# Patient Record
Sex: MALE | Race: WHITE | NOT HISPANIC OR LATINO | Employment: PART TIME | URBAN - METROPOLITAN AREA
[De-identification: names, ages, dates, MRNs, and addresses within clinical notes are randomized per-mention and may not be internally consistent; named-entity substitution may affect disease eponyms.]

---

## 2017-11-10 ENCOUNTER — TRANSCRIBE ORDERS (OUTPATIENT)
Dept: ADMINISTRATIVE | Facility: HOSPITAL | Age: 22
End: 2017-11-10

## 2017-11-10 ENCOUNTER — APPOINTMENT (OUTPATIENT)
Dept: LAB | Facility: HOSPITAL | Age: 22
End: 2017-11-10
Payer: COMMERCIAL

## 2017-11-10 DIAGNOSIS — E66.01 MORBID OBESITY (HCC): ICD-10-CM

## 2017-11-10 DIAGNOSIS — Z13.1 SCREENING FOR DIABETES MELLITUS: ICD-10-CM

## 2017-11-10 DIAGNOSIS — Z13.29 SCREENING FOR THYROID DISORDER: ICD-10-CM

## 2017-11-10 DIAGNOSIS — R53.83 FATIGUE, UNSPECIFIED TYPE: ICD-10-CM

## 2017-11-10 DIAGNOSIS — Z13.89 ENCOUNTER FOR ROUTINE SCREENING FOR MALFORMATION USING ULTRASONICS: ICD-10-CM

## 2017-11-10 DIAGNOSIS — Z13.220 SCREENING FOR LIPOID DISORDERS: ICD-10-CM

## 2017-11-10 DIAGNOSIS — Z00.00 ROUTINE GENERAL MEDICAL EXAMINATION AT A HEALTH CARE FACILITY: Primary | ICD-10-CM

## 2017-11-10 DIAGNOSIS — Z13.0 SCREENING FOR IRON DEFICIENCY ANEMIA: ICD-10-CM

## 2017-11-10 DIAGNOSIS — Z00.00 ROUTINE GENERAL MEDICAL EXAMINATION AT A HEALTH CARE FACILITY: ICD-10-CM

## 2017-11-10 LAB
ALBUMIN SERPL BCP-MCNC: 4 G/DL (ref 3.5–5)
ALP SERPL-CCNC: 78 U/L (ref 46–484)
ALT SERPL W P-5'-P-CCNC: 26 U/L (ref 12–78)
ANION GAP SERPL CALCULATED.3IONS-SCNC: 10 MMOL/L (ref 4–13)
AST SERPL W P-5'-P-CCNC: 21 U/L (ref 5–45)
BASOPHILS # BLD AUTO: 0.02 THOUSANDS/ΜL (ref 0–0.1)
BASOPHILS NFR BLD AUTO: 0 % (ref 0–1)
BILIRUB SERPL-MCNC: 0.6 MG/DL (ref 0.2–1)
BILIRUB UR QL STRIP: NEGATIVE
BUN SERPL-MCNC: 13 MG/DL (ref 5–25)
CALCIUM SERPL-MCNC: 9.5 MG/DL (ref 8.3–10.1)
CHLORIDE SERPL-SCNC: 104 MMOL/L (ref 100–108)
CHOLEST SERPL-MCNC: 125 MG/DL (ref 50–200)
CLARITY UR: CLEAR
CO2 SERPL-SCNC: 26 MMOL/L (ref 21–32)
COLOR UR: YELLOW
CREAT SERPL-MCNC: 0.73 MG/DL (ref 0.6–1.3)
EOSINOPHIL # BLD AUTO: 0.1 THOUSAND/ΜL (ref 0–0.61)
EOSINOPHIL NFR BLD AUTO: 2 % (ref 0–6)
ERYTHROCYTE [DISTWIDTH] IN BLOOD BY AUTOMATED COUNT: 12.1 % (ref 11.6–15.1)
EST. AVERAGE GLUCOSE BLD GHB EST-MCNC: 94 MG/DL
GFR SERPL CREATININE-BSD FRML MDRD: 134 ML/MIN/1.73SQ M
GLUCOSE P FAST SERPL-MCNC: 82 MG/DL (ref 65–99)
GLUCOSE UR STRIP-MCNC: NEGATIVE MG/DL
HBA1C MFR BLD: 4.9 % (ref 4.2–6.3)
HCT VFR BLD AUTO: 41.7 % (ref 36.5–49.3)
HDLC SERPL-MCNC: 57 MG/DL (ref 40–60)
HGB BLD-MCNC: 14.5 G/DL (ref 12–17)
HGB UR QL STRIP.AUTO: NEGATIVE
KETONES UR STRIP-MCNC: NEGATIVE MG/DL
LDLC SERPL CALC-MCNC: 57 MG/DL (ref 0–100)
LEUKOCYTE ESTERASE UR QL STRIP: NEGATIVE
LYMPHOCYTES # BLD AUTO: 1.8 THOUSANDS/ΜL (ref 0.6–4.47)
LYMPHOCYTES NFR BLD AUTO: 30 % (ref 14–44)
MCH RBC QN AUTO: 29 PG (ref 26.8–34.3)
MCHC RBC AUTO-ENTMCNC: 34.8 G/DL (ref 31.4–37.4)
MCV RBC AUTO: 83 FL (ref 82–98)
MONOCYTES # BLD AUTO: 0.57 THOUSAND/ΜL (ref 0.17–1.22)
MONOCYTES NFR BLD AUTO: 10 % (ref 4–12)
NEUTROPHILS # BLD AUTO: 3.46 THOUSANDS/ΜL (ref 1.85–7.62)
NEUTS SEG NFR BLD AUTO: 58 % (ref 43–75)
NITRITE UR QL STRIP: NEGATIVE
NRBC BLD AUTO-RTO: 0 /100 WBCS
PH UR STRIP.AUTO: 6 [PH] (ref 4.5–8)
PLATELET # BLD AUTO: 219 THOUSANDS/UL (ref 149–390)
PMV BLD AUTO: 9.9 FL (ref 8.9–12.7)
POTASSIUM SERPL-SCNC: 4.5 MMOL/L (ref 3.5–5.3)
PROT SERPL-MCNC: 7.9 G/DL (ref 6.4–8.2)
PROT UR STRIP-MCNC: NEGATIVE MG/DL
RBC # BLD AUTO: 5 MILLION/UL (ref 3.88–5.62)
SODIUM SERPL-SCNC: 140 MMOL/L (ref 136–145)
SP GR UR STRIP.AUTO: 1.02 (ref 1–1.03)
TRIGL SERPL-MCNC: 57 MG/DL
TSH SERPL DL<=0.05 MIU/L-ACNC: 1.66 UIU/ML (ref 0.46–3.98)
UROBILINOGEN UR QL STRIP.AUTO: 0.2 E.U./DL
WBC # BLD AUTO: 5.97 THOUSAND/UL (ref 4.31–10.16)

## 2017-11-10 PROCEDURE — 36415 COLL VENOUS BLD VENIPUNCTURE: CPT

## 2017-11-10 PROCEDURE — 85025 COMPLETE CBC W/AUTO DIFF WBC: CPT

## 2017-11-10 PROCEDURE — 80061 LIPID PANEL: CPT

## 2017-11-10 PROCEDURE — 83036 HEMOGLOBIN GLYCOSYLATED A1C: CPT

## 2017-11-10 PROCEDURE — 80053 COMPREHEN METABOLIC PANEL: CPT

## 2017-11-10 PROCEDURE — 84443 ASSAY THYROID STIM HORMONE: CPT

## 2017-11-10 PROCEDURE — 81003 URINALYSIS AUTO W/O SCOPE: CPT

## 2019-06-19 RX ORDER — FLUTICASONE PROPIONATE 50 MCG
2 SPRAY, SUSPENSION (ML) NASAL
COMMUNITY
Start: 2017-06-13 | End: 2019-06-20 | Stop reason: ALTCHOICE

## 2019-06-20 ENCOUNTER — OFFICE VISIT (OUTPATIENT)
Dept: BARIATRICS | Facility: CLINIC | Age: 24
End: 2019-06-20
Payer: COMMERCIAL

## 2019-06-20 VITALS
RESPIRATION RATE: 18 BRPM | WEIGHT: 315 LBS | HEIGHT: 76 IN | HEART RATE: 88 BPM | TEMPERATURE: 98.3 F | DIASTOLIC BLOOD PRESSURE: 78 MMHG | BODY MASS INDEX: 38.36 KG/M2 | SYSTOLIC BLOOD PRESSURE: 120 MMHG

## 2019-06-20 DIAGNOSIS — R63.5 ABNORMAL WEIGHT GAIN: ICD-10-CM

## 2019-06-20 DIAGNOSIS — E66.01 OBESITY, CLASS III, BMI 40-49.9 (MORBID OBESITY) (HCC): Primary | ICD-10-CM

## 2019-06-20 PROCEDURE — 99203 OFFICE O/P NEW LOW 30 MIN: CPT | Performed by: PHYSICIAN ASSISTANT

## 2019-06-25 ENCOUNTER — APPOINTMENT (OUTPATIENT)
Dept: LAB | Facility: HOSPITAL | Age: 24
End: 2019-06-25
Payer: COMMERCIAL

## 2019-06-25 DIAGNOSIS — R63.5 ABNORMAL WEIGHT GAIN: ICD-10-CM

## 2019-06-25 DIAGNOSIS — E66.01 OBESITY, CLASS III, BMI 40-49.9 (MORBID OBESITY) (HCC): ICD-10-CM

## 2019-06-25 LAB
ALBUMIN SERPL BCP-MCNC: 3.9 G/DL (ref 3.5–5)
ALP SERPL-CCNC: 78 U/L (ref 46–116)
ALT SERPL W P-5'-P-CCNC: 32 U/L (ref 12–78)
ANION GAP SERPL CALCULATED.3IONS-SCNC: 9 MMOL/L (ref 4–13)
AST SERPL W P-5'-P-CCNC: 18 U/L (ref 5–45)
BILIRUB SERPL-MCNC: 0.5 MG/DL (ref 0.2–1)
BUN SERPL-MCNC: 10 MG/DL (ref 5–25)
CALCIUM SERPL-MCNC: 9.2 MG/DL (ref 8.3–10.1)
CHLORIDE SERPL-SCNC: 106 MMOL/L (ref 100–108)
CHOLEST SERPL-MCNC: 153 MG/DL (ref 50–200)
CO2 SERPL-SCNC: 26 MMOL/L (ref 21–32)
CREAT SERPL-MCNC: 0.79 MG/DL (ref 0.6–1.3)
EST. AVERAGE GLUCOSE BLD GHB EST-MCNC: 91 MG/DL
GFR SERPL CREATININE-BSD FRML MDRD: 128 ML/MIN/1.73SQ M
GLUCOSE P FAST SERPL-MCNC: 89 MG/DL (ref 65–99)
HBA1C MFR BLD: 4.8 % (ref 4.2–6.3)
HDLC SERPL-MCNC: 35 MG/DL (ref 40–60)
INSULIN SERPL-ACNC: 16.1 MU/L (ref 3–25)
LDLC SERPL CALC-MCNC: 99 MG/DL (ref 0–100)
NONHDLC SERPL-MCNC: 118 MG/DL
POTASSIUM SERPL-SCNC: 4.4 MMOL/L (ref 3.5–5.3)
PROT SERPL-MCNC: 7.4 G/DL (ref 6.4–8.2)
SODIUM SERPL-SCNC: 141 MMOL/L (ref 136–145)
TRIGL SERPL-MCNC: 94 MG/DL
TSH SERPL DL<=0.05 MIU/L-ACNC: 1.16 UIU/ML (ref 0.36–3.74)

## 2019-06-25 PROCEDURE — 83525 ASSAY OF INSULIN: CPT

## 2019-06-25 PROCEDURE — 80053 COMPREHEN METABOLIC PANEL: CPT

## 2019-06-25 PROCEDURE — 83036 HEMOGLOBIN GLYCOSYLATED A1C: CPT

## 2019-06-25 PROCEDURE — 36415 COLL VENOUS BLD VENIPUNCTURE: CPT

## 2019-06-25 PROCEDURE — 80061 LIPID PANEL: CPT

## 2019-06-25 PROCEDURE — 84443 ASSAY THYROID STIM HORMONE: CPT

## 2019-06-26 ENCOUNTER — TELEPHONE (OUTPATIENT)
Dept: BARIATRICS | Facility: CLINIC | Age: 24
End: 2019-06-26

## 2019-06-26 ENCOUNTER — OFFICE VISIT (OUTPATIENT)
Dept: BARIATRICS | Facility: CLINIC | Age: 24
End: 2019-06-26

## 2019-06-26 VITALS — HEIGHT: 76 IN | WEIGHT: 315 LBS | BODY MASS INDEX: 38.36 KG/M2

## 2019-06-26 DIAGNOSIS — R63.5 ABNORMAL WEIGHT GAIN: ICD-10-CM

## 2019-06-26 PROCEDURE — WMSOUP

## 2019-06-26 PROCEDURE — VLCD

## 2019-06-26 PROCEDURE — WMBVRGE

## 2019-06-26 PROCEDURE — RECHECK

## 2019-06-26 PROCEDURE — WMBAR

## 2019-07-02 ENCOUNTER — TELEPHONE (OUTPATIENT)
Dept: BARIATRICS | Facility: CLINIC | Age: 24
End: 2019-07-02

## 2019-07-10 ENCOUNTER — OFFICE VISIT (OUTPATIENT)
Dept: BARIATRICS | Facility: CLINIC | Age: 24
End: 2019-07-10

## 2019-07-10 VITALS
BODY MASS INDEX: 38.36 KG/M2 | WEIGHT: 315 LBS | DIASTOLIC BLOOD PRESSURE: 80 MMHG | HEIGHT: 76 IN | SYSTOLIC BLOOD PRESSURE: 124 MMHG

## 2019-07-10 DIAGNOSIS — R63.5 ABNORMAL WEIGHT GAIN: ICD-10-CM

## 2019-07-10 DIAGNOSIS — E66.01 OBESITY, CLASS III, BMI 40-49.9 (MORBID OBESITY) (HCC): Primary | ICD-10-CM

## 2019-07-10 PROCEDURE — WMBVRGE: Performed by: DIETITIAN, REGISTERED

## 2019-07-10 PROCEDURE — RECHECK: Performed by: DIETITIAN, REGISTERED

## 2019-07-10 PROCEDURE — WMBAR: Performed by: DIETITIAN, REGISTERED

## 2019-07-10 PROCEDURE — VLCD: Performed by: DIETITIAN, REGISTERED

## 2019-07-10 NOTE — PROGRESS NOTES
Weight Management Medical Nutrition Assessment  Fermin Mora presented for a VL follow-up session  Today's weight is 388 6#  He has lost 18 4# in the past 2 weeks  Tolerating meal replacements without difficulty  Consuming at least 80oz of water in addition to the the water used to mix replacements  Patient experiencing mild constipation  Recommend add high fiber shakes and start taking 2 Fiber Gummies daily  Anthropometric Measurements  Start Weight (lbs):407#  Current Weight (lbs): 388 6#  TBW % Change from start weight:5%  Ideal Body Weight (lbs):199#  Goal Weight (lbs):ST#  LTG: 320#    Weight Loss History  Previous weight loss attempts: Self Created Diets (Portion Control, Healthy Food Choices, etc )    Food and Nutrition Related History    Food Recall  9am Breakfast:Bar   Snack:  11-11:30amLunch:Shake  Snack:  3:00pm Dinner:Soup   7:00-8:00pm Snack:Shake      Beverages: water  Volume of beverage intake: 80-100oz    Weekends: Same  Cravings: none reported   Trouble area of day:none reported    Frequency of Eating out: irregularly  Food restrictions:none reported  Cooking: self   Food Shopping: self    Physical Activity Intake  Activity: none repported  Frequency:infrequently  Physical limitations/barriers to exercise: none reported    Estimated Needs  Energy  [employer] [place] Energy Needs: BMR : 2861 calories    2# loss weekly sedentary:  2433 calories   Protein:108-136gm      (1 2-1 5g/kg IBW)  Fluid: 105oz     (35mL/kg IBW)    Nutrition Diagnosis  Yes;     Overweight/obesity  related to Excess energy intake as evidenced by  BMI more than normative standard for age and sex (obesity-grade III 36+)       Nutrition Intervention    Nutrition Prescription  Calories:760 calories/ 43 net carbs using 3 meal replacements and 1 bar daily   Protein:96gm   Fluid:80-100oz    Meal Plan  VLCD Diet Meal Plan : 3 meal replacements and 1 bar    Nutrition Education:    VLCD Manual  Calorie controlled menu  Lean protein food choices  Healthy snack options  Food journaling tips      Nutrition Counseling:  Strategies: meal planning, portion sizes, healthy snack choices, hydration, fiber intake, protein intake, exercise, food journal      Monitoring and Evaluation:  Evaluation criteria:  Energy Intake  Meet protein needs  Maintain adequate hydration  Monitor weekly weight  Meal planning/preparation  Food journal   Decreased portions at mealtimes and snacks  Physical activity     Barriers to learning:none  Readiness to change: Action  Comprehension: good  Expected Compliance: good

## 2019-07-18 ENCOUNTER — TRANSCRIBE ORDERS (OUTPATIENT)
Dept: ADMINISTRATIVE | Facility: HOSPITAL | Age: 24
End: 2019-07-18

## 2019-07-18 ENCOUNTER — TELEPHONE (OUTPATIENT)
Dept: BARIATRICS | Facility: CLINIC | Age: 24
End: 2019-07-18

## 2019-07-18 ENCOUNTER — APPOINTMENT (OUTPATIENT)
Dept: LAB | Facility: HOSPITAL | Age: 24
End: 2019-07-18
Payer: COMMERCIAL

## 2019-07-18 DIAGNOSIS — Z13.1 SCREENING FOR DIABETES MELLITUS: ICD-10-CM

## 2019-07-18 DIAGNOSIS — Z13.89 ENCOUNTER FOR ROUTINE SCREENING FOR MALFORMATION USING ULTRASONICS: ICD-10-CM

## 2019-07-18 DIAGNOSIS — Z13.220 SCREENING FOR LIPOID DISORDERS: ICD-10-CM

## 2019-07-18 DIAGNOSIS — Z00.00 ROUTINE GENERAL MEDICAL EXAMINATION AT A HEALTH CARE FACILITY: ICD-10-CM

## 2019-07-18 DIAGNOSIS — Z13.29 SCREENING FOR THYROID DISORDER: ICD-10-CM

## 2019-07-18 DIAGNOSIS — Z13.0 SCREENING FOR IRON DEFICIENCY ANEMIA: ICD-10-CM

## 2019-07-18 DIAGNOSIS — Z00.00 ROUTINE GENERAL MEDICAL EXAMINATION AT A HEALTH CARE FACILITY: Primary | ICD-10-CM

## 2019-07-18 DIAGNOSIS — E66.01 OBESITY, CLASS III, BMI 40-49.9 (MORBID OBESITY) (HCC): ICD-10-CM

## 2019-07-18 LAB
ALBUMIN SERPL BCP-MCNC: 4 G/DL (ref 3.5–5)
ALP SERPL-CCNC: 81 U/L (ref 46–116)
ALT SERPL W P-5'-P-CCNC: 37 U/L (ref 12–78)
ANION GAP SERPL CALCULATED.3IONS-SCNC: 13 MMOL/L (ref 4–13)
AST SERPL W P-5'-P-CCNC: 23 U/L (ref 5–45)
BASOPHILS # BLD AUTO: 0.03 THOUSANDS/ΜL (ref 0–0.1)
BASOPHILS NFR BLD AUTO: 1 % (ref 0–1)
BILIRUB SERPL-MCNC: 0.6 MG/DL (ref 0.2–1)
BILIRUB UR QL STRIP: ABNORMAL
BUN SERPL-MCNC: 9 MG/DL (ref 5–25)
CALCIUM SERPL-MCNC: 9.7 MG/DL (ref 8.3–10.1)
CHLORIDE SERPL-SCNC: 103 MMOL/L (ref 100–108)
CHOLEST SERPL-MCNC: 126 MG/DL (ref 50–200)
CLARITY UR: CLEAR
CO2 SERPL-SCNC: 24 MMOL/L (ref 21–32)
COLOR UR: YELLOW
CREAT SERPL-MCNC: 0.76 MG/DL (ref 0.6–1.3)
EOSINOPHIL # BLD AUTO: 0.13 THOUSAND/ΜL (ref 0–0.61)
EOSINOPHIL NFR BLD AUTO: 3 % (ref 0–6)
ERYTHROCYTE [DISTWIDTH] IN BLOOD BY AUTOMATED COUNT: 13.1 % (ref 11.6–15.1)
GFR SERPL CREATININE-BSD FRML MDRD: 130 ML/MIN/1.73SQ M
GLUCOSE P FAST SERPL-MCNC: 75 MG/DL (ref 65–99)
GLUCOSE UR STRIP-MCNC: NEGATIVE MG/DL
HCT VFR BLD AUTO: 42.5 % (ref 36.5–49.3)
HDLC SERPL-MCNC: 33 MG/DL (ref 40–60)
HGB BLD-MCNC: 14.4 G/DL (ref 12–17)
HGB UR QL STRIP.AUTO: NEGATIVE
IMM GRANULOCYTES # BLD AUTO: 0.01 THOUSAND/UL (ref 0–0.2)
IMM GRANULOCYTES NFR BLD AUTO: 0 % (ref 0–2)
KETONES UR STRIP-MCNC: ABNORMAL MG/DL
LDLC SERPL CALC-MCNC: 77 MG/DL (ref 0–100)
LEUKOCYTE ESTERASE UR QL STRIP: NEGATIVE
LYMPHOCYTES # BLD AUTO: 1.81 THOUSANDS/ΜL (ref 0.6–4.47)
LYMPHOCYTES NFR BLD AUTO: 35 % (ref 14–44)
MAGNESIUM SERPL-MCNC: 1.8 MG/DL (ref 1.6–2.6)
MCH RBC QN AUTO: 29.1 PG (ref 26.8–34.3)
MCHC RBC AUTO-ENTMCNC: 33.9 G/DL (ref 31.4–37.4)
MCV RBC AUTO: 86 FL (ref 82–98)
MONOCYTES # BLD AUTO: 0.47 THOUSAND/ΜL (ref 0.17–1.22)
MONOCYTES NFR BLD AUTO: 9 % (ref 4–12)
NEUTROPHILS # BLD AUTO: 2.66 THOUSANDS/ΜL (ref 1.85–7.62)
NEUTS SEG NFR BLD AUTO: 52 % (ref 43–75)
NITRITE UR QL STRIP: NEGATIVE
NONHDLC SERPL-MCNC: 93 MG/DL
NRBC BLD AUTO-RTO: 0 /100 WBCS
PH UR STRIP.AUTO: 6 [PH]
PLATELET # BLD AUTO: 193 THOUSANDS/UL (ref 149–390)
PMV BLD AUTO: 11.3 FL (ref 8.9–12.7)
POTASSIUM SERPL-SCNC: 4.2 MMOL/L (ref 3.5–5.3)
PROT SERPL-MCNC: 7.1 G/DL (ref 6.4–8.2)
PROT UR STRIP-MCNC: NEGATIVE MG/DL
RBC # BLD AUTO: 4.95 MILLION/UL (ref 3.88–5.62)
SODIUM SERPL-SCNC: 140 MMOL/L (ref 136–145)
SP GR UR STRIP.AUTO: >=1.03 (ref 1–1.03)
TRIGL SERPL-MCNC: 78 MG/DL
TSH SERPL DL<=0.05 MIU/L-ACNC: 1.66 UIU/ML (ref 0.36–3.74)
UROBILINOGEN UR QL STRIP.AUTO: 1 E.U./DL
WBC # BLD AUTO: 5.11 THOUSAND/UL (ref 4.31–10.16)

## 2019-07-18 PROCEDURE — 80061 LIPID PANEL: CPT

## 2019-07-18 PROCEDURE — 80053 COMPREHEN METABOLIC PANEL: CPT

## 2019-07-18 PROCEDURE — 83735 ASSAY OF MAGNESIUM: CPT

## 2019-07-18 PROCEDURE — 84443 ASSAY THYROID STIM HORMONE: CPT

## 2019-07-18 PROCEDURE — 36415 COLL VENOUS BLD VENIPUNCTURE: CPT

## 2019-07-18 PROCEDURE — 81003 URINALYSIS AUTO W/O SCOPE: CPT | Performed by: INTERNAL MEDICINE

## 2019-07-18 PROCEDURE — 85025 COMPLETE CBC W/AUTO DIFF WBC: CPT

## 2019-07-18 NOTE — TELEPHONE ENCOUNTER
Relayed lab results and PA recommendations to patient, who verbalized understanding and was thrilled  ----- Message from Carlos Silver PA-C sent at 7/18/2019  1:26 PM EDT -----  Magnesium,cmp, tsh all within normal range   Ok to continue vlcd

## 2019-07-24 ENCOUNTER — OFFICE VISIT (OUTPATIENT)
Dept: BARIATRICS | Facility: CLINIC | Age: 24
End: 2019-07-24

## 2019-07-24 VITALS
HEIGHT: 76 IN | WEIGHT: 315 LBS | DIASTOLIC BLOOD PRESSURE: 78 MMHG | SYSTOLIC BLOOD PRESSURE: 118 MMHG | BODY MASS INDEX: 38.36 KG/M2

## 2019-07-24 DIAGNOSIS — R63.5 ABNORMAL WEIGHT GAIN: ICD-10-CM

## 2019-07-24 PROCEDURE — RECHECK

## 2019-07-24 PROCEDURE — WMBVRGE

## 2019-07-24 PROCEDURE — VLCD

## 2019-07-24 PROCEDURE — WMSOUP

## 2019-07-24 PROCEDURE — WMBAR

## 2019-07-24 NOTE — PROGRESS NOTES
Weight Management Medical Nutrition Assessment  Bg Parks presented for a VLCD follow-up session  Today's weight is 375 6 lbs giving him a loss of 13 lbs in the last 2 weeks  He continues to tolerate the meal replacements with no difficulty  He cotninues to drink the 80 oz or more of water per day  He continues to have a little constipation even after drinking the meal replacments with fiber  REivewed the appropriate fiber supplements that he could take  BP taken and labs reviewed by PA -  VIBHA  He plants to continue on VLCD at [facility] time  Anthropometric Measurements  Start Weight (lbs):407 lbs  Current Weight (lbs): 375 6 lbs  TBW % Change from start weight:8%  Ideal Body Weight (lbs):199 lbs  Goal Weight (lbs):ST lbs  LTG: 320 lbs     Weight Loss History  Previous weight loss attempts: Self Created Diets (Portion Control, Healthy Food Choices, etc )     Food and Nutrition Related History     Food Recall  9am Breakfast:Bar        Snack:  11-11:30amLunch:Shake  Snack:  3:00pm Dinner:Soup   7:00-8:00pm Snack:Shake        Beverages: water  Volume of beverage intake: 80-100oz     Weekends: Same  Cravings: none reported   Trouble area of day:none reported     Frequency of Eating out: irregularly  Food restrictions:none reported  Cooking: self   Food Shopping: self     Physical Activity Intake  Activity: none repported  Frequency:infrequently  Physical limitations/barriers to exercise: none reported     Estimated Needs  Energy  [employer] [place] Energy Needs: BMR : 2802 calories    2# loss weekly sedentary:  2363 calories   Protein:108-136gm      (1 2-1 5g/kg IBW)  Fluid: 105oz     (35mL/kg IBW)     Nutrition Diagnosis  Yes;     Overweight/obesity  related to Excess energy intake as evidenced by  BMI more than normative standard for age and sex (obesity-grade III 36+)     Nutrition Intervention     Nutrition Prescription  Calories:760 calories/ 43 net carbs using 3 meal replacements and 1 bar daily   Protein:96gm Fluid:80-100oz     Meal Plan  [facility] Diet Meal Plan : 3 meal replacements and 1 bar     Nutrition Education:    [facility] Manual  Calorie controlled menu  Lean protein food choices  Healthy snack options  Food journaling tips        Nutrition Counseling:  Strategies: meal planning, portion sizes, healthy snack choices, hydration, fiber intake, protein intake, exercise, food journal        Monitoring and Evaluation:  Evaluation criteria:  Energy Intake  Meet protein needs  Maintain adequate hydration  Monitor weekly weight  Meal planning/preparation  Food journal   Decreased portions at mealtimes and snacks  Physical activity      Barriers to learning:none  Readiness to change: Action  Comprehension: good  Expected Compliance: good

## 2019-08-06 ENCOUNTER — OFFICE VISIT (OUTPATIENT)
Dept: BARIATRICS | Facility: CLINIC | Age: 24
End: 2019-08-06

## 2019-08-06 VITALS
HEIGHT: 76 IN | WEIGHT: 315 LBS | SYSTOLIC BLOOD PRESSURE: 120 MMHG | BODY MASS INDEX: 38.36 KG/M2 | DIASTOLIC BLOOD PRESSURE: 70 MMHG

## 2019-08-06 DIAGNOSIS — R63.5 ABNORMAL WEIGHT GAIN: Primary | ICD-10-CM

## 2019-08-06 PROCEDURE — WMSOUP

## 2019-08-06 PROCEDURE — VLCD

## 2019-08-06 PROCEDURE — RECHECK

## 2019-08-06 PROCEDURE — WMBVRGE

## 2019-08-06 PROCEDURE — WMBAR

## 2019-08-06 NOTE — PROGRESS NOTES
Weight Management Medical Nutrition Assessment  Catrachito Shaffer presented for a VLCD follow-up session  Today's weight is 365 6 lbs giving him a loss of 10 lbs in the last 2 weeks, and at total weight loss of 41 4 lbs since starting on VLCD   He continues to tolerate the meal replacements with no difficulty  He cotninues to drink the 80 oz or more of water per day  He reports that he has been taking a fiber supplement and that has taken care of his constipation  BP taken and labs ordered  He plans to continue with VLCD  Anthropometric Measurements  Start Weight (lbs):407 lbs  Current Weight (lbs): 365 6 lbs  TBW % Change from start weight:10%  Ideal Body Weight (lbs):199 lbs  Goal Weight (lbs):ST lbs  LTG: 320 lbs     Weight Loss History  Previous weight loss attempts: Self Created Diets (Portion Control, Healthy Food Choices, etc )     Food and Nutrition Related History     Food Recall  9am Breakfast:Bar        Snack:  11-11:30amLunch:Shake  Snack:  3:00pm Dinner:Soup   7:00-8:00pm Snack:Shake        Beverages: water  Volume of beverage intake: 80-100oz     Weekends: Same  Cravings: none reported   Trouble area of day:none reported     Frequency of Eating out: irregularly  Food restrictions:none reported  Cooking: self   Food Shopping: self     Physical Activity Intake  Activity: none repported  Frequency:infrequently  Physical limitations/barriers to exercise: none reported     Estimated Needs  Energy  [employer] [place] Energy Needs:  BMR : 4395   calories    2# loss weekly sedentary:  2308  calories   Protein:108-136gm      (1 2-1 5g/kg IBW)  Fluid: 105oz     (35mL/kg IBW)     Nutrition Diagnosis  Yes;    Overweight/obesity  related to Excess energy intake as evidenced by  BMI more than normative standard for age and sex (obesity-grade III 36+)     Nutrition Intervention     Nutrition Prescription  Calories:760 calories/ 43 net carbs using 3 meal replacements and 1 bar daily   Protein:96gm   Fluid:80-100oz     Meal Plan  [facility] Diet Meal Plan : 3 meal replacements and 1 bar     Nutrition Education:    [facility] Manual  Calorie controlled menu  Lean protein food choices  Healthy snack options  Food journaling tips        Nutrition Counseling:  Strategies: meal planning, portion sizes, healthy snack choices, hydration, fiber intake, protein intake, exercise, food journal        Monitoring and Evaluation:  Evaluation criteria:  Energy Intake  Meet protein needs  Maintain adequate hydration  Monitor weekly weight  Meal planning/preparation  Food journal   Decreased portions at mealtimes and snacks  Physical activity      Barriers to learning:none  Readiness to change: Action  Comprehension: good  Expected Compliance: very good

## 2019-08-19 ENCOUNTER — TELEPHONE (OUTPATIENT)
Dept: BARIATRICS | Facility: CLINIC | Age: 24
End: 2019-08-19

## 2019-08-19 ENCOUNTER — APPOINTMENT (OUTPATIENT)
Dept: LAB | Facility: HOSPITAL | Age: 24
End: 2019-08-19
Payer: COMMERCIAL

## 2019-08-19 DIAGNOSIS — R63.5 ABNORMAL WEIGHT GAIN: ICD-10-CM

## 2019-08-19 LAB
ANION GAP SERPL CALCULATED.3IONS-SCNC: 15 MMOL/L (ref 4–13)
BUN SERPL-MCNC: 8 MG/DL (ref 5–25)
CALCIUM SERPL-MCNC: 9.6 MG/DL (ref 8.3–10.1)
CHLORIDE SERPL-SCNC: 102 MMOL/L (ref 100–108)
CO2 SERPL-SCNC: 24 MMOL/L (ref 21–32)
CREAT SERPL-MCNC: 0.77 MG/DL (ref 0.6–1.3)
GFR SERPL CREATININE-BSD FRML MDRD: 130 ML/MIN/1.73SQ M
GLUCOSE P FAST SERPL-MCNC: 73 MG/DL (ref 65–99)
MAGNESIUM SERPL-MCNC: 1.7 MG/DL (ref 1.6–2.6)
POTASSIUM SERPL-SCNC: 4.3 MMOL/L (ref 3.5–5.3)
SODIUM SERPL-SCNC: 141 MMOL/L (ref 136–145)

## 2019-08-19 PROCEDURE — 80048 BASIC METABOLIC PNL TOTAL CA: CPT

## 2019-08-19 PROCEDURE — 36415 COLL VENOUS BLD VENIPUNCTURE: CPT

## 2019-08-19 PROCEDURE — 83735 ASSAY OF MAGNESIUM: CPT

## 2019-08-19 NOTE — TELEPHONE ENCOUNTER
Informed patient that his labs were within normal range and he is OK to continue with VLCD  Patient verbalized understanding and was thrilled  ----- Message from Dirk Gloria PA-C sent at 8/19/2019  2:12 PM EDT -----  Please call patient and inform him that his bmp and magnesium are within normal range   Ok to continue vlcd

## 2019-08-20 ENCOUNTER — OFFICE VISIT (OUTPATIENT)
Dept: BARIATRICS | Facility: CLINIC | Age: 24
End: 2019-08-20

## 2019-08-20 VITALS
HEIGHT: 76 IN | DIASTOLIC BLOOD PRESSURE: 82 MMHG | WEIGHT: 315 LBS | BODY MASS INDEX: 38.36 KG/M2 | SYSTOLIC BLOOD PRESSURE: 120 MMHG

## 2019-08-20 DIAGNOSIS — R63.5 ABNORMAL WEIGHT GAIN: ICD-10-CM

## 2019-08-20 PROCEDURE — WMBVRGE

## 2019-08-20 PROCEDURE — WMSOUP

## 2019-08-20 PROCEDURE — RECHECK

## 2019-08-20 PROCEDURE — VLCD

## 2019-08-20 PROCEDURE — WMBAR

## 2019-08-20 NOTE — PROGRESS NOTES
Weight Management Medical Nutrition Assessment  Wood Adrian presented for a VLCD follow-up session  Today's weight is 354 0 lbs giving him a loss of 11 6 lbs in the last 2 weeks, and at total weight loss of 50 5 lbs since starting on VLCD   He continues to tolerate the meal replacements with no difficulty   He cotninues to drink the 80 oz or more of water per day  [facility] reports that he has been taking a fiber supplement and that has taken care of his constipation  Lab results reviewed by PA and WNL  He plans to continue with VLCD      Anthropometric Measurements  Start Weight (lbs):407 lbs  Current Weight (lbs): 354 0 lbs  TBW % Change from start weight:12%  Ideal Body Weight (lbs):199 lbs  Goal Weight (lbs):ST lbs  LTG: 320 lbs     Weight Loss History  Previous weight loss attempts: Self Created Diets (Portion Control, Healthy Food Choices, etc )     Food and Nutrition Related History     Food Recall  9am Breakfast:Bar        Snack:  11-11:30amLunch:Shake  Snack:  3:00pm Dinner:Soup   7:00-8:00pm Snack:Shake        Beverages: water  Volume of beverage intake: 80-100oz     Weekends: Same  Cravings: none reported   Trouble area of day:none reported     Frequency of Eating out: irregularly  Food restrictions:none reported  Cooking: self   Food Shopping: self     Physical Activity Intake  Activity: none repported  Frequency:infrequently  Physical limitations/barriers to exercise: none reported     Estimated Needs  Energy  [employer] [place] Energy Needs:  BMR : 5733   calories    2# loss weekly sedentary:  2245  calories   Protein:108-136gm      (1 2-1 5g/kg IBW)  Fluid: 105oz     (35mL/kg IBW)     Nutrition Diagnosis  Yes;    Overweight/obesity  related to Excess energy intake as evidenced by  BMI more than normative standard for age and sex (obesity-grade III 36+)     Nutrition Intervention     Nutrition Prescription  Calories:760 calories/ 43 net carbs using 3 meal replacements and 1 bar daily   Protein:96gm   Fluid:80-100oz     Meal Plan  [facility] Diet Meal Plan : 3 meal replacements and 1 bar     Nutrition Education:    [facility] Manual  Calorie controlled menu  Lean protein food choices  Healthy snack options  Food journaling tips        Nutrition Counseling:  Strategies: meal planning, portion sizes, healthy snack choices, hydration, fiber intake, protein intake, exercise, food journal        Monitoring and Evaluation:  Evaluation criteria:  Energy Intake  Meet protein needs  Maintain adequate hydration  Monitor weekly weight  Meal planning/preparation  Food journal   Decreased portions at mealtimes and snacks  Physical activity      Barriers to learning:none  Readiness to change: Action  Comprehension: good  Expected Compliance: very good

## 2019-09-03 ENCOUNTER — OFFICE VISIT (OUTPATIENT)
Dept: BARIATRICS | Facility: CLINIC | Age: 24
End: 2019-09-03

## 2019-09-03 VITALS
DIASTOLIC BLOOD PRESSURE: 80 MMHG | HEIGHT: 76 IN | SYSTOLIC BLOOD PRESSURE: 120 MMHG | WEIGHT: 315 LBS | BODY MASS INDEX: 38.36 KG/M2

## 2019-09-03 DIAGNOSIS — R63.5 ABNORMAL WEIGHT GAIN: ICD-10-CM

## 2019-09-03 PROCEDURE — WMBAR

## 2019-09-03 PROCEDURE — VLCD

## 2019-09-03 PROCEDURE — WMBVRGE

## 2019-09-03 PROCEDURE — WMSOUP

## 2019-09-03 PROCEDURE — RECHECK

## 2019-09-03 NOTE — PROGRESS NOTES
Weight Management Medical Nutrition Assessment  Neeta Hernandez presented for a VLCD follow-up session  Today's weight is 346 2 lbs giving him a loss of 7 8 lbs in the last 2 weeks, and at total weight loss of 58 lbs since starting on VLCD   He continues to tolerate the meal replacements with no difficulty   He cotninues to drink the 80 oz or more of water per day   He reports that he has been taking a fiber supplement and that has taken care of his constipation  BP taken  He plans to continue with VLCD      Anthropometric Measurements  Start Weight (lbs):407 lbs  Current Weight (lbs): 346 2 lbs  TBW % Change from start weight:14%  Ideal Body Weight (lbs):199 lbs  Goal Weight (lbs):ST lbs  LTG: 320 lbs     Weight Loss History  Previous weight loss attempts: Self Created Diets (Portion Control, Healthy Food Choices, etc )     Food and Nutrition Related History     Food Recall  9am Breakfast:Bar        Snack:  11-11:30amLunch:Shake  Snack:  3:00pm Dinner:Soup   7:00-8:00pm Snack:Shake        Beverages: water  Volume of beverage intake: 80-100oz     Weekends: Same  Cravings: none reported   Trouble area of day:none reported     Frequency of Eating out: irregularly  Food restrictions:none reported  Cooking: self   Food Shopping: self     Physical Activity Intake  Activity: none repported  Frequency:infrequently  Physical limitations/barriers to exercise: none reported     Estimated Needs  Energy  [employer] [place] Energy Needs:  BMR : 2669   calories    2# loss weekly sedentary:  2203  calories   Protein:108-136gm      (1 2-1 5g/kg IBW)  Fluid: 105oz     (35mL/kg IBW)     Nutrition Diagnosis  Yes;    Overweight/obesity  related to Excess energy intake as evidenced by  BMI more than normative standard for age and sex (obesity-grade III 36+)     Nutrition Intervention     Nutrition Prescription  Calories:760 calories/ 43 net carbs using 3 meal replacements and 1 bar daily   Protein:96gm   Fluid:80-100oz     Meal Plan  VLCD Diet Meal Plan : 3 meal replacements and 1 bar     Nutrition Education:    VLCD Manual  Calorie controlled menu  Lean protein food choices  Healthy snack options  Food journaling tips        Nutrition Counseling:  Strategies: meal planning, portion sizes, healthy snack choices, hydration, fiber intake, protein intake, exercise, food journal        Monitoring and Evaluation:  Evaluation criteria:  Energy Intake  Meet protein needs  Maintain adequate hydration  Monitor weekly weight  Meal planning/preparation  Food journal   Decreased portions at mealtimes and snacks  Physical activity      Barriers to learning:none  Readiness to change: Action  Comprehension: good  Expected Compliance: very good

## 2019-09-24 ENCOUNTER — OFFICE VISIT (OUTPATIENT)
Dept: BARIATRICS | Facility: CLINIC | Age: 24
End: 2019-09-24

## 2019-09-24 VITALS
HEIGHT: 76 IN | WEIGHT: 315 LBS | DIASTOLIC BLOOD PRESSURE: 78 MMHG | BODY MASS INDEX: 38.36 KG/M2 | SYSTOLIC BLOOD PRESSURE: 118 MMHG

## 2019-09-24 DIAGNOSIS — R63.5 ABNORMAL WEIGHT GAIN: ICD-10-CM

## 2019-09-24 PROCEDURE — VLCD

## 2019-09-24 PROCEDURE — WMSOUP

## 2019-09-24 PROCEDURE — WMBVRGE

## 2019-09-24 PROCEDURE — RECHECK

## 2019-09-24 PROCEDURE — WMBAR

## 2019-09-24 NOTE — PROGRESS NOTES
Weight Management Medical Nutrition Assessment  Leslie Landry presented for his 12 week follow-up in [facility]  Today's weight is 333 0 lbs giving him a loss of 13 2 lbs in the last 2 weeks, and at total weight loss of 74 1 lbs since starting on VLCD   He continues to tolerate the meal replacements with no difficulty   He cotninues to drink the 80 oz or more of water per day   He reports that he has been taking a fiber supplement and that has taken care of his constipation  BP taken  Moving forward, he plans to do a partial meal plan (2 meal replacements a bar, a low carb snack and a sensible dinner ) Reviewed keto principles, portion sizes, food journaling and provided a keto meal plan  Anthropometric Measurements  Start Weight (lbs):407 lbs  Current Weight (lbs): 333 0 lbs  TBW % Change from start weight:16%  Ideal Body Weight (lbs):199 lbs  Goal Weight (lbs):ST lbs     Weight Loss History  Previous weight loss attempts: Self Created Diets (Portion Control, Healthy Food Choices, etc )     Food and Nutrition Related History     Food Recall  9am Breakfast shake  Snack:  11-11:30amLunch:Shake  Snack:  3:00pm Dinner:Soup   7:00-8:00pm Snack:Shake        Beverages: water  Volume of beverage intake: 80-100oz     Weekends: Same  Cravings: none reported   Trouble area of day:none reported     Frequency of Eating out: irregularly  Food restrictions:none reported  Cooking: self   Food Shopping: self     Physical Activity Intake  Activity: none repported  Frequency:infrequently  Physical limitations/barriers to exercise: none reported     Estimated Needs  Energy  [employer] [place] Energy Needs:  BMR : 2609   calories    2# loss weekly sedentary: 2103   calories   Protein:108-136gm      (1 2-1 5g/kg IBW)  Fluid: 105oz     (35mL/kg IBW)     Nutrition Diagnosis  Yes;    Overweight/obesity  related to Excess energy intake as evidenced by  BMI more than normative standard for age and sex (obesity-grade III 36+)     Nutrition Intervention     Nutrition Prescription  Calories:1000 -1200   Protein:96gm   Fluid:80-100oz     Meal Plan  Partial - 2 meal replacements, a bar, lean protein and non starchy vegetables for dinner and a low carb snack     Nutrition Education:    VLCD Manual  Calorie controlled menu  Lean protein food choices  Healthy snack options  Food journaling tips        Nutrition Counseling:  Strategies: meal planning, portion sizes, healthy snack choices, hydration, fiber intake, protein intake, exercise, food journal        Monitoring and Evaluation:  Evaluation criteria:  Energy Intake  Meet protein needs  Maintain adequate hydration  Monitor weekly weight  Meal planning/preparation  Food journal   Decreased portions at mealtimes and snacks  Physical activity      Barriers to learning:none  Readiness to change: Action  Comprehension: good  Expected Compliance: very good

## 2019-09-27 ENCOUNTER — TELEPHONE (OUTPATIENT)
Dept: BARIATRICS | Facility: CLINIC | Age: 24
End: 2019-09-27

## 2019-09-27 NOTE — TELEPHONE ENCOUNTER
SENT PT A EMAIL TO CALL AND RESCHEDULE HIS APPOINTMENT BECAUSE I COULD NOT REACH HIM VIA PHONE  MAILBOX WAS FULL

## 2021-01-21 ENCOUNTER — OFFICE VISIT (OUTPATIENT)
Dept: BARIATRICS | Facility: CLINIC | Age: 26
End: 2021-01-21
Payer: COMMERCIAL

## 2021-01-21 VITALS
SYSTOLIC BLOOD PRESSURE: 142 MMHG | HEART RATE: 97 BPM | DIASTOLIC BLOOD PRESSURE: 80 MMHG | WEIGHT: 315 LBS | HEIGHT: 76 IN | BODY MASS INDEX: 38.36 KG/M2 | TEMPERATURE: 97.8 F

## 2021-01-21 DIAGNOSIS — E66.01 OBESITY, CLASS III, BMI 40-49.9 (MORBID OBESITY) (HCC): Primary | ICD-10-CM

## 2021-01-21 DIAGNOSIS — R63.5 ABNORMAL WEIGHT GAIN: ICD-10-CM

## 2021-01-21 DIAGNOSIS — Z82.49 FAMILY HISTORY OF HEART DISEASE: ICD-10-CM

## 2021-01-21 DIAGNOSIS — Z83.49 FAMILY HISTORY OF OBESITY: ICD-10-CM

## 2021-01-21 PROCEDURE — 99214 OFFICE O/P EST MOD 30 MIN: CPT | Performed by: INTERNAL MEDICINE

## 2021-01-21 RX ORDER — DEXTROAMPHETAMINE SACCHARATE, AMPHETAMINE ASPARTATE, DEXTROAMPHETAMINE SULFATE AND AMPHETAMINE SULFATE 5; 5; 5; 5 MG/1; MG/1; MG/1; MG/1
TABLET ORAL
COMMUNITY
End: 2021-01-21

## 2021-01-21 NOTE — PROGRESS NOTES
Assessment/Plan:  Bradley Hill was seen today for consult  Diagnoses and all orders for this visit:    Family history of obesity  Family history of heart disease  Abnormal weight gain  Obesity, Class III, BMI 40-49 9 (morbid obesity) ([facility] [address] )  -     Comprehensive metabolic panel; Future  -     Magnesium; Future  Pt interested in doing VLCD again  Okay to proceed if labs are WNL   Start exercising 3-4x wk -- interested in going back to the gym     Follow up in approximately 2 weeks with Non-Surgical Dietician  Subjective:   Chief Complaint   Patient presents with    Consult     Patient is here for initial MWM consult with Dr Yosvany Moya  BMI 46  Positive stop bang (4/8)  Patient ID: Yana De Los Santos  is a 23 y o  male with excess weight/obesity here to pursue weight management  Patient is pursuing Conservative Program      HPI  -Initial weight loss goal of 5-10% weight loss for improved health  Wt Readings from Last 10 Encounters:   01/21/21 (!) 170 kg (375 lb 3 2 oz)   09/24/19 (!) 151 kg (333 lb)   09/03/19 (!) 157 kg (346 lb 3 2 oz)   08/20/19 (!) 161 kg (354 lb)   08/06/19 (!) 166 kg (365 lb 9 6 oz)   07/24/19 (!) 170 kg (375 lb 9 6 oz)   07/10/19 (!) 176 kg (388 lb 9 6 oz)   06/26/19 (!) 185 kg (407 lb)   06/20/19 (!) 183 kg (404 lb 6 4 oz)   10/10/16 (!) 154 kg (340 lb) (>99 %, Z= 3 40)*     * Growth percentiles are based on CDC (Boys, 2-20 Years) data       Initial weight: 375 2 (404 lbs initially 6/2019)   Current weight: 375 lbs   Change in weight: 0  Goal: <300  Weight problem since he was a child  Reached 300 lbs in 8th grade    B- skipped  S-  L- 2 ham and cheese sandwich  S-   D- chili  S-   Drinks- 32 oz water  Alcohol- no  Exercise- no   Sleep 8-10hours   STOP bang- 4/8; declined sleep medicine referral      The following portions of the patient's history were reviewed and updated as appropriate: allergies, current medications, past family history, past medical history, past social history, past surgical history, and problem list     Review of Systems   Constitutional: Negative for appetite change, chills and fever  HENT: Negative for rhinorrhea and sore throat  Respiratory: Negative for cough, chest tightness and shortness of breath  Cardiovascular: Negative for chest pain and leg swelling  Gastrointestinal: Negative for abdominal pain, constipation, diarrhea, nausea and vomiting  Endocrine: Negative for cold intolerance and heat intolerance  Genitourinary: Negative for difficulty urinating  Musculoskeletal: Negative for arthralgias  Skin: Negative for color change  Neurological: Negative for dizziness, numbness and headaches  Psychiatric/Behavioral: Negative for sleep disturbance  The patient is not nervous/anxious  All other systems reviewed and are negative  Objective:  /80 (BP Location: Right arm, Patient Position: Sitting, Cuff Size: Large)   Pulse 97   Temp 97 8 °F (36 6 °C) (Temporal)   Ht 6' 3 5" (1 918 m)   Wt (!) 170 kg (375 lb 3 2 oz)   BMI 46 28 kg/m²   Constitutional: Well-developed, well-nourished and obese Body mass index is 46 28 kg/m²  Michelle Tidwell HEENT: No conjunctival pallor or jaundice  Pulmonary: No increased work of breathing or signs of respiratory distress  Clear to auscultation  CV: Normal rate and rhythm, S1 and S2, without murmurs  GI: Obese  Normal bowel sounds  Soft and nontender  MSK: No edema   Neuro: Oriented to person, place and time  Normal Speech  Normal gait  Psych: Normal affect and mood      Labs and Imaging  Reviewed

## 2021-01-30 ENCOUNTER — LAB (OUTPATIENT)
Dept: LAB | Facility: HOSPITAL | Age: 26
End: 2021-01-30
Payer: COMMERCIAL

## 2021-01-30 DIAGNOSIS — E66.01 OBESITY, CLASS III, BMI 40-49.9 (MORBID OBESITY) (HCC): ICD-10-CM

## 2021-01-30 LAB
ALBUMIN SERPL BCP-MCNC: 3.6 G/DL (ref 3.5–5)
ALP SERPL-CCNC: 76 U/L (ref 46–116)
ALT SERPL W P-5'-P-CCNC: 62 U/L (ref 12–78)
ANION GAP SERPL CALCULATED.3IONS-SCNC: 8 MMOL/L (ref 4–13)
AST SERPL W P-5'-P-CCNC: 33 U/L (ref 5–45)
BILIRUB SERPL-MCNC: 0.3 MG/DL (ref 0.2–1)
BUN SERPL-MCNC: 16 MG/DL (ref 5–25)
CALCIUM SERPL-MCNC: 8.9 MG/DL (ref 8.3–10.1)
CHLORIDE SERPL-SCNC: 105 MMOL/L (ref 100–108)
CO2 SERPL-SCNC: 27 MMOL/L (ref 21–32)
CREAT SERPL-MCNC: 0.83 MG/DL (ref 0.6–1.3)
GFR SERPL CREATININE-BSD FRML MDRD: 124 ML/MIN/1.73SQ M
GLUCOSE P FAST SERPL-MCNC: 86 MG/DL (ref 65–99)
MAGNESIUM SERPL-MCNC: 1.8 MG/DL (ref 1.6–2.6)
POTASSIUM SERPL-SCNC: 4.5 MMOL/L (ref 3.5–5.3)
PROT SERPL-MCNC: 7.1 G/DL (ref 6.4–8.2)
SODIUM SERPL-SCNC: 140 MMOL/L (ref 136–145)

## 2021-01-30 PROCEDURE — 36415 COLL VENOUS BLD VENIPUNCTURE: CPT

## 2021-01-30 PROCEDURE — 83735 ASSAY OF MAGNESIUM: CPT

## 2021-01-30 PROCEDURE — 80053 COMPREHEN METABOLIC PANEL: CPT

## 2021-02-03 ENCOUNTER — OFFICE VISIT (OUTPATIENT)
Dept: BARIATRICS | Facility: CLINIC | Age: 26
End: 2021-02-03

## 2021-02-03 VITALS — TEMPERATURE: 97.6 F | HEIGHT: 76 IN | WEIGHT: 315 LBS | BODY MASS INDEX: 38.36 KG/M2

## 2021-02-03 DIAGNOSIS — R63.5 ABNORMAL WEIGHT GAIN: ICD-10-CM

## 2021-02-03 PROCEDURE — WMSOUP

## 2021-02-03 PROCEDURE — VLCD

## 2021-02-03 PROCEDURE — WMBAR

## 2021-02-03 PROCEDURE — WMBVRGE

## 2021-02-03 PROCEDURE — RECHECK

## 2021-02-03 NOTE — PROGRESS NOTES
Reviewed VLCD diet principles  Developed meal plan and reviewed product use  Ordered 2 week supply  Gave patient written VLCD education packet and left patient contact number  Will call contact patient in 2-3 days to assess tolerance  Please note: this is a restart

## 2021-02-16 NOTE — PROGRESS NOTES
Weight Management Medical Nutrition Assessment  Timmy Hussein was here today for his 2 week VLCD follow-up  Today he weighs 355 2 lbs giving him a loss of 25 4 lbs in the last 2 weeks  He reports that he did not "feel well" for the first 3 days, but once in ketosis he feels fine  He is tolerating product with out difficulty and reports not constipation at this time  Labs ordered, BP taken  He plans to continue at this time  Patient seen by Medical Provider in past 6 months:  yes  Requested to schedule appointment with Medical Provider: No      Anthropometric Measurements  Start Weight (#): 380 6 lbs  Current Weight (#):355 2 lbs  TBW % Change from start weight: 6%  Ideal Body Weight (#): 199  Goal Weight (#):300 lbs    Weight Loss History  Previous weight loss attempts: Commercial Programs (Weight Watchers, [employer], etc )  Self Created Diets (Portion Control, Healthy Food Choices, etc )  VLCD 2 years ago    Food and Nutrition Related History    Food Recall  Breakfast: shake   Snack:   Lunch:shake  Snack:bar  Dinner:shake  Snack:       Beverages: water  Volume of beverage intake: 80 oz    Weekends: Same  Cravings: carbs  Trouble area of day:evening    Frequency of Eating out: irregularly  Food restrictions:none  Cooking: self   Food Shopping: self    Physical Activity Intake  Activity:none currently  Frequency:rarely  Physical limitations/barriers to exercise: none    Estimated Needs  Energy    [employer] [place] Energy Needs: BMR : 2700   1-2# loss weekly sedentary: 2240 - 2740           1-2# loss weekly lightly active:  Maintenance calories for sedentary activity level: 3240  Protein:108 - 135     (1 2-1 5g/kg IBW)  Fluid: 106   (35mL/kg IBW)    Nutrition Diagnosis  Yes;     Overweight/obesity  related to Excess energy intake as evidenced by  BMI more than normative standard for age and sex (obesity-grade III 36+)       Nutrition Intervention    Nutrition Prescription  Calories:760   Protein:96  Fluid:80    Meal Plan ([place]/Pro/Carb)  Breakfast: 200/27/10  Snack:  Lunch:200/27/10  Snack:  Dinner:200/27/10  Snack: 160/15/13    Nutrition Education:    Calorie controlled menu  Lean protein food choices  Healthy snack options  Food journaling tips      Nutrition Counseling:  Strategies: meal planning, portion sizes, healthy snack choices, hydration, fiber intake, protein intake, exercise, food journal      Monitoring and Evaluation:  Evaluation criteria:  Energy Intake  Meet protein needs  Maintain adequate hydration  Monitor weekly weight  Meal planning/preparation  Food journal   Decreased portions at mealtimes and snacks  Physical activity     Barriers to learning:none  Readiness to change: Preparation:  (Getting ready to change)  and Action:  (Changing behavior)  Comprehension: good  Expected Compliance: very good

## 2021-02-17 ENCOUNTER — OFFICE VISIT (OUTPATIENT)
Dept: BARIATRICS | Facility: CLINIC | Age: 26
End: 2021-02-17

## 2021-02-17 VITALS
DIASTOLIC BLOOD PRESSURE: 78 MMHG | WEIGHT: 315 LBS | TEMPERATURE: 98 F | HEIGHT: 76 IN | SYSTOLIC BLOOD PRESSURE: 138 MMHG | BODY MASS INDEX: 38.36 KG/M2

## 2021-02-17 DIAGNOSIS — R63.5 ABNORMAL WEIGHT GAIN: Primary | ICD-10-CM

## 2021-02-17 PROCEDURE — VLCD

## 2021-02-17 PROCEDURE — WMBAR

## 2021-02-17 PROCEDURE — RECHECK

## 2021-02-17 PROCEDURE — WMSOUP

## 2021-02-17 PROCEDURE — WMBVRGE

## 2021-03-02 ENCOUNTER — TELEPHONE (OUTPATIENT)
Dept: BARIATRICS | Facility: CLINIC | Age: 26
End: 2021-03-02

## 2021-03-02 NOTE — TELEPHONE ENCOUNTER
Spoke to [place] about getting his blood work done for VLCD  He has his 2 week follow-up tomorrow and did go to get his blood work yesterday, but he got new insurance, and did not his updated card with him  He will get the blood work done tomorrow  He was call to reschedule his appt, but I told him it was okay to keep the appt as long as he get it done tomorrow

## 2021-03-03 ENCOUNTER — LAB (OUTPATIENT)
Dept: LAB | Facility: HOSPITAL | Age: 26
End: 2021-03-03

## 2021-03-03 ENCOUNTER — OFFICE VISIT (OUTPATIENT)
Dept: BARIATRICS | Facility: CLINIC | Age: 26
End: 2021-03-03

## 2021-03-03 VITALS — BODY MASS INDEX: 38.36 KG/M2 | HEIGHT: 76 IN | TEMPERATURE: 96.4 F | WEIGHT: 315 LBS

## 2021-03-03 DIAGNOSIS — R63.5 ABNORMAL WEIGHT GAIN: ICD-10-CM

## 2021-03-03 PROCEDURE — RECHECK

## 2021-03-03 PROCEDURE — VLCD

## 2021-03-03 NOTE — PROGRESS NOTES
Weight Management Medical Nutrition Assessment  Romero Delgadillo was here today for his 4 week VLCD follow-up  Today he weighs 346 6 lbs giving him a loss of 8 6 lbs in the last 2 weeks  He reports that he "feels great" and is tolerating product with no difficulty  He did not get his blood work done, but was going to the lab today to get it done  He plans to continue at this time       Patient seen by Medical Provider in past 6 months:  yes  Requested to schedule appointment with Medical Provider: No        Anthropometric Measurements  Start Weight (#): 380 6 lbs  Current Weight (#):346 2 lbs  TBW % Change from start weight: 9%  Ideal Body Weight (#): 199  Goal Weight (#):300 lbs     Weight Loss History  Previous weight loss attempts: Commercial Programs ([employer] Watchers, Imelda Mcgill, etc )  Self Created Diets (Portion Control, Healthy Food Choices, etc )  VLCD 2 years ago     Food and Nutrition Related History     Food Recall  Breakfast: shake           Snack:   Lunch:shake  Snack:bar  Dinner:shake  Snack:         Beverages: water  Volume of beverage intake: 80 oz     Weekends: Same  Cravings: carbs  Trouble area of day:evening     Frequency of Eating out: irregularly  Food restrictions:none  Cooking: self   Food Shopping: self     Physical Activity Intake  Activity:none currently  Frequency:rarely  Physical limitations/barriers to exercise: none     Estimated Needs  Energy     [employer] [place] Energy Needs: BMR : 9673   1-2# loss weekly sedentary: 2193 - 2693           1-2# loss weekly lightly active:2658 - 3158  Maintenance calories for sedentary activity level: 3193  Protein:108 - 135     (1 2-1 5g/kg IBW)  Fluid: 106   (35mL/kg IBW)     Nutrition Diagnosis  Yes;     Overweight/obesity  related to Excess energy intake as evidenced by  BMI more than normative standard for age and sex (obesity-grade III 36+)     Nutrition Intervention     Nutrition Prescription  Calories:760   Protein:96  Fluid:80     Meal Plan ([place]/Pro/Carb)  Breakfast: 200/27/10  Snack:  Lunch:200/27/10  Snack:  Dinner:200/27/10  Snack: 160/15/13     Nutrition Education:    Calorie controlled menu  Lean protein food choices  Healthy snack options  Food journaling tips        Nutrition Counseling:  Strategies: meal planning, portion sizes, healthy snack choices, hydration, fiber intake, protein intake, exercise, food journal        Monitoring and Evaluation:  Evaluation criteria:  Energy Intake  Meet protein needs  Maintain adequate hydration  Monitor weekly weight  Meal planning/preparation  Food journal   Decreased portions at mealtimes and snacks  Physical activity      Barriers to learning:none  Readiness to change: Preparation:  (Getting ready to change)  and Action:  (Changing behavior)  Comprehension: good  Expected Compliance: very good

## 2021-03-17 ENCOUNTER — OFFICE VISIT (OUTPATIENT)
Dept: BARIATRICS | Facility: CLINIC | Age: 26
End: 2021-03-17

## 2021-03-17 VITALS — HEIGHT: 76 IN | BODY MASS INDEX: 38.36 KG/M2 | TEMPERATURE: 98.2 F | WEIGHT: 315 LBS

## 2021-03-17 DIAGNOSIS — R63.5 ABNORMAL WEIGHT GAIN: Primary | ICD-10-CM

## 2021-03-17 PROCEDURE — RECHECK

## 2021-03-17 PROCEDURE — WMBAR

## 2021-03-17 PROCEDURE — WMSOUP

## 2021-03-17 PROCEDURE — WMBVRGE

## 2021-03-17 PROCEDURE — VLCD

## 2021-03-17 NOTE — PROGRESS NOTES
Weight Management Medical Nutrition Assessment  Tessa Borjas was here today for his 6 week VLCD follow-up   Today he weighs 332 4 lbs giving him a loss of 14 2 lbs in the last 2 weeks  He reports that he "feels great" and is tolerating product with no difficulty  Questions asked and answered  Patient seen by Medical Provider in past 6 months:  yes  Requested to schedule appointment with Medical Provider: No        Anthropometric Measurements  Start Weight (#): 380 6 lbs  Current Weight (#):332 4 lbs  TBW % Change from start weight: 13%  Ideal Body Weight (#): 199  Goal Weight (#):300 lbs     Weight Loss History  Previous weight loss attempts: Commercial Programs (Weight Watchers, Minetta Pong, etc )  Self Created Diets (Portion Control, Healthy Food Choices, etc )  VLCD 2 years ago     Food and Nutrition Related History     Food Recall  Breakfast: shake           Snack:   Lunch:shake  Snack:bar  Dinner:shake  Snack:         Beverages: water  Volume of beverage intake: 80 oz     Weekends: Same  Cravings: carbs  Trouble area of day:evening     Frequency of Eating out: irregularly  Food restrictions:none  Cooking: self   Food Shopping: self     Physical Activity Intake  Activity:none currently  Frequency:rarely  Physical limitations/barriers to exercise: none     Estimated Needs  Energy     [employer] [place] Energy Needs:  BMR : 7953   3-7# loss weekly sedentary: 2116 - 2616         1-2# loss weekly lightly active:2570 - 3070  Maintenance calories for sedentary activity level: 3116  Protein:108 - 135     (1 2-1 5g/kg IBW)  Fluid: 106   (35mL/kg IBW)     Nutrition Diagnosis  Yes;    Overweight/obesity  related to Excess energy intake as evidenced by  BMI more than normative standard for age and sex (obesity-grade III 36+)     Nutrition Intervention     Nutrition Prescription  Calories:760   Protein:96  Fluid:80     Meal Plan ([place]/Pro/Carb)  Breakfast: 200/27/10  Snack:  Lunch:200/27/10  Snack:  Dinner:200/27/10  Snack: 160/15/13     Nutrition Education:    Calorie controlled menu  Lean protein food choices  Healthy snack options  Food journaling tips        Nutrition Counseling:  Strategies: meal planning, portion sizes, healthy snack choices, hydration, fiber intake, protein intake, exercise, food journal        Monitoring and Evaluation:  Evaluation criteria:  Energy Intake  Meet protein needs  Maintain adequate hydration  Monitor weekly weight  Meal planning/preparation  Food journal   Decreased portions at mealtimes and snacks  Physical activity      Barriers to learning:none  Readiness to change: Preparation:  (Getting ready to change)  and Action:  (Changing behavior)  Comprehension: good  Expected Compliance: very good